# Patient Record
Sex: MALE | Race: WHITE | NOT HISPANIC OR LATINO | URBAN - METROPOLITAN AREA
[De-identification: names, ages, dates, MRNs, and addresses within clinical notes are randomized per-mention and may not be internally consistent; named-entity substitution may affect disease eponyms.]

---

## 2017-06-13 ENCOUNTER — OUTPATIENT (OUTPATIENT)
Dept: OUTPATIENT SERVICES | Facility: HOSPITAL | Age: 57
LOS: 1 days | Discharge: HOME | End: 2017-06-13

## 2017-06-28 DIAGNOSIS — E11.9 TYPE 2 DIABETES MELLITUS WITHOUT COMPLICATIONS: ICD-10-CM

## 2018-01-22 ENCOUNTER — OUTPATIENT (OUTPATIENT)
Dept: OUTPATIENT SERVICES | Facility: HOSPITAL | Age: 58
LOS: 1 days | Discharge: HOME | End: 2018-01-22

## 2018-01-22 DIAGNOSIS — I50.9 HEART FAILURE, UNSPECIFIED: ICD-10-CM

## 2018-01-22 DIAGNOSIS — I25.10 ATHEROSCLEROTIC HEART DISEASE OF NATIVE CORONARY ARTERY WITHOUT ANGINA PECTORIS: ICD-10-CM

## 2018-01-22 DIAGNOSIS — I10 ESSENTIAL (PRIMARY) HYPERTENSION: ICD-10-CM

## 2018-01-22 DIAGNOSIS — E11.9 TYPE 2 DIABETES MELLITUS WITHOUT COMPLICATIONS: ICD-10-CM

## 2019-08-15 ENCOUNTER — EMERGENCY (EMERGENCY)
Facility: HOSPITAL | Age: 59
LOS: 0 days | Discharge: ANOTHER HOSPITAL | End: 2019-08-15
Attending: EMERGENCY MEDICINE | Admitting: EMERGENCY MEDICINE
Payer: MEDICARE

## 2019-08-15 VITALS
SYSTOLIC BLOOD PRESSURE: 98 MMHG | RESPIRATION RATE: 18 BRPM | HEIGHT: 70 IN | DIASTOLIC BLOOD PRESSURE: 58 MMHG | TEMPERATURE: 96 F | HEART RATE: 95 BPM | OXYGEN SATURATION: 99 % | WEIGHT: 149.91 LBS

## 2019-08-15 VITALS
DIASTOLIC BLOOD PRESSURE: 62 MMHG | RESPIRATION RATE: 18 BRPM | SYSTOLIC BLOOD PRESSURE: 105 MMHG | HEART RATE: 102 BPM | OXYGEN SATURATION: 99 %

## 2019-08-15 DIAGNOSIS — R00.0 TACHYCARDIA, UNSPECIFIED: ICD-10-CM

## 2019-08-15 DIAGNOSIS — R10.9 UNSPECIFIED ABDOMINAL PAIN: ICD-10-CM

## 2019-08-15 DIAGNOSIS — K56.609 UNSPECIFIED INTESTINAL OBSTRUCTION, UNSPECIFIED AS TO PARTIAL VERSUS COMPLETE OBSTRUCTION: ICD-10-CM

## 2019-08-15 LAB
ALBUMIN SERPL ELPH-MCNC: 2.5 G/DL — LOW (ref 3.5–5.2)
ALP SERPL-CCNC: 93 U/L — SIGNIFICANT CHANGE UP (ref 30–115)
ALT FLD-CCNC: 9 U/L — SIGNIFICANT CHANGE UP (ref 0–41)
ANION GAP SERPL CALC-SCNC: 15 MMOL/L — HIGH (ref 7–14)
ANION GAP SERPL CALC-SCNC: 17 MMOL/L — HIGH (ref 7–14)
APPEARANCE UR: CLEAR — SIGNIFICANT CHANGE UP
APTT BLD: 34.6 SEC — SIGNIFICANT CHANGE UP (ref 27–39.2)
AST SERPL-CCNC: 36 U/L — SIGNIFICANT CHANGE UP (ref 0–41)
BASE EXCESS BLDV CALC-SCNC: -4.8 MMOL/L — LOW (ref -2–2)
BILIRUB SERPL-MCNC: 0.3 MG/DL — SIGNIFICANT CHANGE UP (ref 0.2–1.2)
BILIRUB UR-MCNC: NEGATIVE — SIGNIFICANT CHANGE UP
BUN SERPL-MCNC: 18 MG/DL — SIGNIFICANT CHANGE UP (ref 10–20)
BUN SERPL-MCNC: 18 MG/DL — SIGNIFICANT CHANGE UP (ref 10–20)
CA-I SERPL-SCNC: 1.15 MMOL/L — SIGNIFICANT CHANGE UP (ref 1.12–1.3)
CALCIUM SERPL-MCNC: 7.7 MG/DL — LOW (ref 8.5–10.1)
CALCIUM SERPL-MCNC: 8.2 MG/DL — LOW (ref 8.5–10.1)
CHLORIDE SERPL-SCNC: 97 MMOL/L — LOW (ref 98–110)
CHLORIDE SERPL-SCNC: 99 MMOL/L — SIGNIFICANT CHANGE UP (ref 98–110)
CO2 SERPL-SCNC: 18 MMOL/L — SIGNIFICANT CHANGE UP (ref 17–32)
CO2 SERPL-SCNC: 19 MMOL/L — SIGNIFICANT CHANGE UP (ref 17–32)
COLOR SPEC: YELLOW — SIGNIFICANT CHANGE UP
CREAT SERPL-MCNC: 1 MG/DL — SIGNIFICANT CHANGE UP (ref 0.7–1.5)
CREAT SERPL-MCNC: 1.1 MG/DL — SIGNIFICANT CHANGE UP (ref 0.7–1.5)
DIFF PNL FLD: SIGNIFICANT CHANGE UP
GAS PNL BLDV: 133 MMOL/L — LOW (ref 136–145)
GAS PNL BLDV: SIGNIFICANT CHANGE UP
GLUCOSE SERPL-MCNC: 259 MG/DL — HIGH (ref 70–99)
GLUCOSE SERPL-MCNC: 274 MG/DL — HIGH (ref 70–99)
GLUCOSE UR QL: ABNORMAL
HCO3 BLDV-SCNC: 18 MMOL/L — LOW (ref 22–29)
HCT VFR BLD CALC: 22 % — LOW (ref 42–52)
HCT VFR BLD CALC: 27.8 % — LOW (ref 42–52)
HCT VFR BLDA CALC: 32.2 % — LOW (ref 34–44)
HGB BLD CALC-MCNC: 10.5 G/DL — LOW (ref 14–18)
HGB BLD-MCNC: 7.1 G/DL — LOW (ref 14–18)
HGB BLD-MCNC: 9 G/DL — LOW (ref 14–18)
INR BLD: 1.22 RATIO — SIGNIFICANT CHANGE UP (ref 0.65–1.3)
KETONES UR-MCNC: ABNORMAL
LACTATE BLDV-MCNC: 1.2 MMOL/L — SIGNIFICANT CHANGE UP (ref 0.5–1.6)
LACTATE SERPL-SCNC: 1.9 MMOL/L — SIGNIFICANT CHANGE UP (ref 0.5–2.2)
LEUKOCYTE ESTERASE UR-ACNC: NEGATIVE — SIGNIFICANT CHANGE UP
MAGNESIUM SERPL-MCNC: 1.8 MG/DL — SIGNIFICANT CHANGE UP (ref 1.8–2.4)
MCHC RBC-ENTMCNC: 26.9 PG — LOW (ref 27–31)
MCHC RBC-ENTMCNC: 27.1 PG — SIGNIFICANT CHANGE UP (ref 27–31)
MCHC RBC-ENTMCNC: 32.3 G/DL — SIGNIFICANT CHANGE UP (ref 32–37)
MCHC RBC-ENTMCNC: 32.4 G/DL — SIGNIFICANT CHANGE UP (ref 32–37)
MCV RBC AUTO: 83.3 FL — SIGNIFICANT CHANGE UP (ref 80–94)
MCV RBC AUTO: 83.7 FL — SIGNIFICANT CHANGE UP (ref 80–94)
NITRITE UR-MCNC: NEGATIVE — SIGNIFICANT CHANGE UP
NRBC # BLD: 0 /100 WBCS — SIGNIFICANT CHANGE UP (ref 0–0)
NRBC # BLD: 0 /100 WBCS — SIGNIFICANT CHANGE UP (ref 0–0)
PCO2 BLDV: 28 MMHG — LOW (ref 41–51)
PH BLDV: 7.43 — SIGNIFICANT CHANGE UP (ref 7.26–7.43)
PH UR: 6 — SIGNIFICANT CHANGE UP (ref 5–8)
PHOSPHATE SERPL-MCNC: 3.1 MG/DL — SIGNIFICANT CHANGE UP (ref 2.1–4.9)
PLATELET # BLD AUTO: 293 K/UL — SIGNIFICANT CHANGE UP (ref 130–400)
PLATELET # BLD AUTO: 382 K/UL — SIGNIFICANT CHANGE UP (ref 130–400)
PO2 BLDV: 38 MMHG — SIGNIFICANT CHANGE UP (ref 20–40)
POTASSIUM BLDV-SCNC: 4.6 MMOL/L — SIGNIFICANT CHANGE UP (ref 3.3–5.6)
POTASSIUM SERPL-MCNC: 4.8 MMOL/L — SIGNIFICANT CHANGE UP (ref 3.5–5)
POTASSIUM SERPL-MCNC: 6.6 MMOL/L — CRITICAL HIGH (ref 3.5–5)
POTASSIUM SERPL-SCNC: 4.8 MMOL/L — SIGNIFICANT CHANGE UP (ref 3.5–5)
POTASSIUM SERPL-SCNC: 6.6 MMOL/L — CRITICAL HIGH (ref 3.5–5)
PROT SERPL-MCNC: 6.6 G/DL — SIGNIFICANT CHANGE UP (ref 6–8)
PROT UR-MCNC: ABNORMAL
PROTHROM AB SERPL-ACNC: 14 SEC — HIGH (ref 9.95–12.87)
RBC # BLD: 2.64 M/UL — LOW (ref 4.7–6.1)
RBC # BLD: 3.32 M/UL — LOW (ref 4.7–6.1)
RBC # FLD: 18 % — HIGH (ref 11.5–14.5)
RBC # FLD: 18.6 % — HIGH (ref 11.5–14.5)
SAO2 % BLDV: 71 % — SIGNIFICANT CHANGE UP
SODIUM SERPL-SCNC: 132 MMOL/L — LOW (ref 135–146)
SODIUM SERPL-SCNC: 133 MMOL/L — LOW (ref 135–146)
SP GR SPEC: >1.05 (ref 1.01–1.02)
UROBILINOGEN FLD QL: SIGNIFICANT CHANGE UP
WBC # BLD: 18.6 K/UL — HIGH (ref 4.8–10.8)
WBC # BLD: 20.61 K/UL — HIGH (ref 4.8–10.8)
WBC # FLD AUTO: 18.6 K/UL — HIGH (ref 4.8–10.8)
WBC # FLD AUTO: 20.61 K/UL — HIGH (ref 4.8–10.8)

## 2019-08-15 PROCEDURE — 71045 X-RAY EXAM CHEST 1 VIEW: CPT | Mod: 26,76

## 2019-08-15 PROCEDURE — 93010 ELECTROCARDIOGRAM REPORT: CPT

## 2019-08-15 PROCEDURE — 74177 CT ABD & PELVIS W/CONTRAST: CPT | Mod: 26

## 2019-08-15 PROCEDURE — 36556 INSERT NON-TUNNEL CV CATH: CPT

## 2019-08-15 PROCEDURE — 99232 SBSQ HOSP IP/OBS MODERATE 35: CPT

## 2019-08-15 PROCEDURE — 99291 CRITICAL CARE FIRST HOUR: CPT | Mod: 25

## 2019-08-15 RX ORDER — PIPERACILLIN AND TAZOBACTAM 4; .5 G/20ML; G/20ML
3.38 INJECTION, POWDER, LYOPHILIZED, FOR SOLUTION INTRAVENOUS ONCE
Refills: 0 | Status: DISCONTINUED | OUTPATIENT
Start: 2019-08-15 | End: 2019-08-15

## 2019-08-15 RX ORDER — PIPERACILLIN AND TAZOBACTAM 4; .5 G/20ML; G/20ML
3.38 INJECTION, POWDER, LYOPHILIZED, FOR SOLUTION INTRAVENOUS ONCE
Refills: 0 | Status: COMPLETED | OUTPATIENT
Start: 2019-08-15 | End: 2019-08-15

## 2019-08-15 RX ORDER — IOHEXOL 300 MG/ML
30 INJECTION, SOLUTION INTRAVENOUS ONCE
Refills: 0 | Status: COMPLETED | OUTPATIENT
Start: 2019-08-15 | End: 2019-08-15

## 2019-08-15 RX ORDER — ONDANSETRON 8 MG/1
4 TABLET, FILM COATED ORAL ONCE
Refills: 0 | Status: COMPLETED | OUTPATIENT
Start: 2019-08-15 | End: 2019-08-15

## 2019-08-15 RX ORDER — SODIUM CHLORIDE 9 MG/ML
1000 INJECTION INTRAMUSCULAR; INTRAVENOUS; SUBCUTANEOUS ONCE
Refills: 0 | Status: COMPLETED | OUTPATIENT
Start: 2019-08-15 | End: 2019-08-15

## 2019-08-15 RX ORDER — SODIUM CHLORIDE 9 MG/ML
1000 INJECTION, SOLUTION INTRAVENOUS ONCE
Refills: 0 | Status: COMPLETED | OUTPATIENT
Start: 2019-08-15 | End: 2019-08-15

## 2019-08-15 RX ORDER — MORPHINE SULFATE 50 MG/1
4 CAPSULE, EXTENDED RELEASE ORAL ONCE
Refills: 0 | Status: DISCONTINUED | OUTPATIENT
Start: 2019-08-15 | End: 2019-08-15

## 2019-08-15 RX ADMIN — SODIUM CHLORIDE 1000 MILLILITER(S): 9 INJECTION, SOLUTION INTRAVENOUS at 07:23

## 2019-08-15 RX ADMIN — PIPERACILLIN AND TAZOBACTAM 25 GRAM(S): 4; .5 INJECTION, POWDER, LYOPHILIZED, FOR SOLUTION INTRAVENOUS at 08:03

## 2019-08-15 RX ADMIN — MORPHINE SULFATE 4 MILLIGRAM(S): 50 CAPSULE, EXTENDED RELEASE ORAL at 04:20

## 2019-08-15 RX ADMIN — IOHEXOL 30 MILLILITER(S): 300 INJECTION, SOLUTION INTRAVENOUS at 03:53

## 2019-08-15 RX ADMIN — SODIUM CHLORIDE 1000 MILLILITER(S): 9 INJECTION, SOLUTION INTRAVENOUS at 06:34

## 2019-08-15 RX ADMIN — ONDANSETRON 4 MILLIGRAM(S): 8 TABLET, FILM COATED ORAL at 04:20

## 2019-08-15 RX ADMIN — SODIUM CHLORIDE 1000 MILLILITER(S): 9 INJECTION INTRAMUSCULAR; INTRAVENOUS; SUBCUTANEOUS at 04:20

## 2019-08-15 NOTE — ED PROVIDER NOTE - CLINICAL SUMMARY MEDICAL DECISION MAKING FREE TEXT BOX
Per surgery resident Dr. Wilson- Dr. Lewis from [facility] accepted the patient. Dr. Wilson gave report to ED attedning. Transfer service involved.

## 2019-08-15 NOTE — ED ADULT NURSE NOTE - ED STAT RN HANDOFF DETAILS 2
Patient being picked up for transferred via ambulance by [facility]. All paperwork provided . Patient being transported via stretcher truck1 with cardiac monitor , IV fluids in progress, NGT disconnect. Patient stable in no acute distress safety maintained.
Refused

## 2019-08-15 NOTE — PROGRESS NOTE ADULT - SUBJECTIVE AND OBJECTIVE BOX
GENERAL SURGERY PROGRESS NOTE     PREET LONG  58y  Male  Hospital day :  POD:  Procedure:   OVERNIGHT EVENTS:    T(F): 96.3 (08-15-19 @ 01:47), Max: 96.3 (08-15-19 @ 01:47)  HR: 126 (08-15-19 @ 06:37) (95 - 126)  BP: 110/58 (08-15-19 @ 06:37) (98/58 - 110/58)  ABP: --  ABP(mean): --  RR: 20 (08-15-19 @ 06:37) (18 - 20)  SpO2: 96% (08-15-19 @ 06:37) (96% - 99%)    DIET/FLUIDS:   NG:                                                                              DRAINS:   BM:   EMESIS:   URINE:  Indwelling Urethral Catheter:     Connect To:  Straight Drainage/Gravity    Indication:  Urine Output Monitoring in Critically Ill (08-15-19 @ 05:25)    GI proph:    AC/ proph:   ABx:     PHYSICAL EXAM:  GENERAL: NAD, well-appearing  CHEST/LUNG: Clear to auscultation bilaterally  HEART: Regular rate and rhythm  ABDOMEN: Soft, Nontender, Nondistended;   EXTREMITIES:  No clubbing, cyanosis, or edema      LABS  Labs:  CAPILLARY BLOOD GLUCOSE                              7.1    18.60 )-----------( 293      ( 15 Aug 2019 05:43 )             22.0         08-15    132<L>  |  99  |  18  ----------------------------<  259<H>  4.8   |  18  |  1.0      Calcium, Total Serum: 7.7 mg/dL (08-15-19 @ 05:43)      LFTs:             6.6  | 0.3  | 36       ------------------[93      ( 15 Aug 2019 03:00 )  2.5  | x    | 9           Lipase:x      Amylase:x         Blood Gas Venous - Lactate: 1.2 mmoL/L (08-15-19 @ 04:47)  Lactate, Blood: 1.9 mmol/L (08-15-19 @ 03:00)      Coags:     14.00  ----< 1.22    ( 15 Aug 2019 05:43 )     34.6                        RADIOLOGY & ADDITIONAL TESTS:  < from: CT Abdomen and Pelvis w/ IV Cont (08.15.19 @ 04:50) >    1. Moderate volume free intraperitoneal fluid with diffuse thickening and   enhancement of the peritoneal lining along with air greater than expected   for this post operative date. Findingsare consistent with peritonitis   and raise suspicion for bowel leak.    2. Diffusely dilated small bowel to the level of the small bowel   anastomosis, compatible with small bowel obstruction which is presumably   confounded by probable reactive change to peritonitis.    3. Post low anterior resection with rectal anastomosis and diverting loop   ileostomy.    4. Small bilateral pleural effusions.    < end of copied text > GENERAL SURGERY PROGRESS NOTE     PREET LONG  58y  Male  Hospital day :  POD:  Procedure:   OVERNIGHT EVENTS:    T(F): 96.3 (08-15-19 @ 01:47), Max: 96.3 (08-15-19 @ 01:47)  HR: 126 (08-15-19 @ 06:37) (95 - 126)  BP: 110/58 (08-15-19 @ 06:37) (98/58 - 110/58)  ABP: --  ABP(mean): --  RR: 20 (08-15-19 @ 06:37) (18 - 20)  SpO2: 96% (08-15-19 @ 06:37) (96% - 99%)    DIET/FLUIDS:   NG:                                                                              DRAINS:   BM:   EMESIS:   URINE:  Indwelling Urethral Catheter:     Connect To:  Straight Drainage/Gravity    Indication:  Urine Output Monitoring in Critically Ill (08-15-19 @ 05:25)    GI proph:    AC/ proph:   ABx:     PHYSICAL EXAM:  GENERAL: NAD, well-appearing  CHEST/LUNG: Clear to auscultation bilaterally  HEART: Regular rate and rhythm  ABDOMEN: midline incision, tender in midline ostomy with fecal output on right side   EXTREMITIES:  No clubbing, cyanosis, or edema      LABS  Labs:  CAPILLARY BLOOD GLUCOSE                              7.1    18.60 )-----------( 293      ( 15 Aug 2019 05:43 )             22.0         08-15    132<L>  |  99  |  18  ----------------------------<  259<H>  4.8   |  18  |  1.0      Calcium, Total Serum: 7.7 mg/dL (08-15-19 @ 05:43)      LFTs:             6.6  | 0.3  | 36       ------------------[93      ( 15 Aug 2019 03:00 )  2.5  | x    | 9           Lipase:x      Amylase:x         Blood Gas Venous - Lactate: 1.2 mmoL/L (08-15-19 @ 04:47)  Lactate, Blood: 1.9 mmol/L (08-15-19 @ 03:00)      Coags:     14.00  ----< 1.22    ( 15 Aug 2019 05:43 )     34.6                        RADIOLOGY & ADDITIONAL TESTS:  < from: CT Abdomen and Pelvis w/ IV Cont (08.15.19 @ 04:50) >    1. Moderate volume free intraperitoneal fluid with diffuse thickening and   enhancement of the peritoneal lining along with air greater than expected   for this post operative date. Findingsare consistent with peritonitis   and raise suspicion for bowel leak.    2. Diffusely dilated small bowel to the level of the small bowel   anastomosis, compatible with small bowel obstruction which is presumably   confounded by probable reactive change to peritonitis.    3. Post low anterior resection with rectal anastomosis and diverting loop   ileostomy.    4. Small bilateral pleural effusions.    < end of copied text >

## 2019-08-15 NOTE — ED ADULT NURSE NOTE - NSIMPLEMENTINTERV_GEN_ALL_ED
Implemented All Fall with Harm Risk Interventions:  [place] to call system. Call bell, personal items and telephone within reach. Instruct patient to call for assistance. Room bathroom lighting operational. Non-slip footwear when patient is off stretcher. Physically safe environment: no spills, clutter or unnecessary equipment. Stretcher in lowest position, wheels locked, appropriate side rails in place. Provide visual cue, wrist band, yellow gown, etc. Monitor gait and stability. Monitor for mental status changes and reorient to person, place, and time. Review medications for side effects contributing to fall risk. Reinforce activity limits and safety measures with patient and family. Provide visual clues: red socks.

## 2019-08-15 NOTE — ED ADULT NURSE NOTE - ED STAT RN HANDOFF DETAILS
Received patient from [facility] to be transferred to [facility] as per RN report given patient awaiting transportation. Patient awake alert and responsive to verbal and tactile stimuli but unwilling to answer questions wants to go home. NGT to left nare with blood tinged output 1000ml on night shift. Martin to bedside drainage with sofia urine. Right sided colostomy with fecal output. V/S stable patient in no acute distress NPO status maintained . Patient resting comfortably with IV hydration and antibotic infusing into right triple lumen patent and intact. All labs and urine sent as ordered. Continue to monitor patient.

## 2019-08-15 NOTE — ED PROCEDURE NOTE - CPROC ED INFUS LINE DETAIL1
The catheter was placed using sterile technique./All lumen(s) aspirated and flushed without difficulty./The location was identified, and the area was draped and prepped./The guidewire was recovered.

## 2019-08-15 NOTE — CONSULT NOTE ADULT - SUBJECTIVE AND OBJECTIVE BOX
GENERAL SURGERY CONSULT NOTE    Patient: PREET LONG , 58y ([birth year])Male   MRN: [id number]  Location: [facility] ED  Visit: 08-15-19 Emergency  Date: 08-15-19 @ 03:36    HPI:  58yM BIBA for [facility] rehab for abdominal pain, nausea, vomiting, s/p of low anterior resection 7/28/19 at [facility] by Dr. Cheatham.     PAST MEDICAL & SURGICAL HISTORY:  s/p of low anterior resection 7/28/19    Home Medications:    VITALS:  T(F): 96.3 (08-15-19 @ 01:47), Max: 96.3 (08-15-19 @ 01:47)  HR: 95 (08-15-19 @ 01:47) (95 - 95)  BP: 98/58 (08-15-19 @ 01:47) (98/58 - 98/58)  RR: 18 (08-15-19 @ 01:47) (18 - 18)  SpO2: 99% (08-15-19 @ 01:47) (99% - 99%)    PHYSICAL EXAM:  General: nauseous.  Cardiac: RRR S1, S2, no Murmurs, rubs or gallops  Respiratory: CTAB  Abdomen: Soft, midline laparotomy scar, RLQ ostomy in place, +stool, +gas, mildly tender, no rebound, no guarding. +BS.    MEDICATIONS  (STANDING):  iohexol 300 mG (iodine)/mL Oral Solution 30 milliLiter(s) Oral once  morphine  - Injectable 4 milliGRAM(s) IV Push Once  ondansetron Injectable 4 milliGRAM(s) IV Push Once  sodium chloride 0.9% Bolus 1000 milliLiter(s) IV Bolus once    LAB/STUDIES:             9.0    20.61 )-----------( 382      ( 15 Aug 2019 03:00 )             27.8     ASSESSMENT:  58yM BIBA for [facility] rehab for abdominal pain, nausea, vomiting, s/p of low anterior resection 7/28/19 at [facility] by Dr. Cheatham. Work up in th ED w/ WBC 20,     PLAN:  - Obtain CTAP w/ PO & IV Contrast.      Above plan discussed with  GENERAL SURGERY CONSULT NOTE    Patient: PREET LONG , 58y ([birth year])Male   MRN: [id number]  Location: [facility] ED  Visit: 08-15-19 Emergency  Date: 08-15-19 @ 03:36    HPI:  58M BIBA for [facility] rehab for abdominal pain, nausea, vomiting, s/p exploratory laparotomy small bowel resection for perforated small bowel 8/6/19 at [facility] due to complication s/p Lap low anterior resection w/ loop ileostomy 7/28/19 at [facility] by Dr. Cheatham.     PAST MEDICAL & SURGICAL HISTORY:  s/p Lap low anterior resection w/ loop ileostomy 7/28/19  s/p exploratory laparotomy small bowel resection for perforated small bowel 8/6/1  DM, HTN, CAD, AICD.  Home Medications:    VITALS:  T(F): 96.3 (08-15-19 @ 01:47), Max: 96.3 (08-15-19 @ 01:47)  HR: 95 (08-15-19 @ 01:47) (95 - 95)  BP: 98/58 (08-15-19 @ 01:47) (98/58 - 98/58)  RR: 18 (08-15-19 @ 01:47) (18 - 18)  SpO2: 99% (08-15-19 @ 01:47) (99% - 99%)    PHYSICAL EXAM:  General: nauseous.  Cardiac: RRR S1, S2, no Murmurs, rubs or gallops  Respiratory: CTAB  Abdomen: Soft, midline laparotomy scar, RLQ ostomy in place, +stool, +gas, mildly tender, no rebound, no guarding. +BS.    MEDICATIONS  (STANDING):  iohexol 300 mG (iodine)/mL Oral Solution 30 milliLiter(s) Oral once  morphine  - Injectable 4 milliGRAM(s) IV Push Once  ondansetron Injectable 4 milliGRAM(s) IV Push Once  sodium chloride 0.9% Bolus 1000 milliLiter(s) IV Bolus once    LAB/STUDIES:             9.0    20.61 )-----------( 382      ( 15 Aug 2019 03:00 )             27.8     08-15    133<L>  |  97<L>  |  18  ----------------------------<  274<H>  6.6<HH>   |  19  |  1.1    Ca    8.2<L>      15 Aug 2019 03:00  Mg     1.9     08-15    TPro  6.6  /  Alb  2.5<L>  /  TBili  0.3  /  DBili  x   /  AST  36  /  ALT  9   /  AlkPhos  93  08-15    LIVER FUNCTIONS - ( 15 Aug 2019 03:00 )  Alb: 2.5 g/dL / Pro: 6.6 g/dL / ALK PHOS: 93 U/L / ALT: 9 U/L / AST: 36 U/L / GGT: x           ASSESSMENT:  58M BIBA for [facility] rehab for abdominal pain, nausea, vomiting, s/p exploratory laparotomy small bowel resection for perforated small bowel 8/6/19 at [facility] due to complication s/p Lap low anterior resection w/ loop ileostomy 7/28/19 at [facility] by Dr. Cheatham. Work up in th ED w/ WBC 20.     PLAN:  - Obtain CTAP w/ PO & IV Contrast.    Above plan discussed with Dr. Albright GENERAL SURGERY CONSULT NOTE    Patient: PREET LONG , 58y ([birth year])Male   MRN: [id number]  Location: [facility] ED  Visit: 08-15-19 Emergency  Date: 08-15-19 @ 03:36    HPI:  58M BIBA for [facility] rehab for abdominal pain, nausea, vomiting, s/p exploratory laparotomy small bowel resection for perforated small bowel 8/6/19 at [facility] due to complication s/p Lap low anterior resection w/ loop ileostomy 7/28/19 at [facility] by Dr. Cheatham.     PAST MEDICAL & SURGICAL HISTORY:  s/p Lap low anterior resection w/ loop ileostomy 7/28/19  s/p exploratory laparotomy small bowel resection for perforated small bowel 8/6/1  DM, HTN, CAD, AICD.  Home Medications:    VITALS:  T(F): 96.3 (08-15-19 @ 01:47), Max: 96.3 (08-15-19 @ 01:47)  HR: 95 (08-15-19 @ 01:47) (95 - 95)  BP: 98/58 (08-15-19 @ 01:47) (98/58 - 98/58)  RR: 18 (08-15-19 @ 01:47) (18 - 18)  SpO2: 99% (08-15-19 @ 01:47) (99% - 99%)    PHYSICAL EXAM:  General: nauseous.  Cardiac: RRR S1, S2, no Murmurs, rubs or gallops  Respiratory: CTAB  Abdomen: Soft, midline laparotomy scar, RLQ ostomy in place, +stool, +gas, mildly tender, no rebound, no guarding. +BS.    MEDICATIONS  (STANDING):  iohexol 300 mG (iodine)/mL Oral Solution 30 milliLiter(s) Oral once  morphine  - Injectable 4 milliGRAM(s) IV Push Once  ondansetron Injectable 4 milliGRAM(s) IV Push Once  sodium chloride 0.9% Bolus 1000 milliLiter(s) IV Bolus once    LAB/STUDIES:             9.0    20.61 )-----------( 382      ( 15 Aug 2019 03:00 )             27.8     08-15    133<L>  |  97<L>  |  18  ----------------------------<  274<H>  6.6<HH>   |  19  |  1.1    Ca    8.2<L>      15 Aug 2019 03:00  Mg     1.9     08-15    TPro  6.6  /  Alb  2.5<L>  /  TBili  0.3  /  DBili  x   /  AST  36  /  ALT  9   /  AlkPhos  93  08-15    LIVER FUNCTIONS - ( 15 Aug 2019 03:00 )  Alb: 2.5 g/dL / Pro: 6.6 g/dL / ALK PHOS: 93 U/L / ALT: 9 U/L / AST: 36 U/L / GGT: x           IMAGING:  < from: CT Abdomen and Pelvis w/ IV Cont (08.15.19 @ 04:50) >  IMPRESSION:    1. Moderate volume free intraperitoneal fluid with diffuse thickening and   enhancement of the peritoneal lining along with air greater than expected   for this post operative date. Findingsare consistent with peritonitis   and raise suspicion for bowel leak.    2. Diffusely dilated small bowel to the level of the small bowel   anastomosis, compatible with small bowel obstruction which is presumably   confounded by probable reactive change to peritonitis.    3. Post low anterior resection with rectal anastomosis and diverting loop   ileostomy.    4. Small bilateral pleural effusions.    ASSESSMENT:  58M BIBA for [facility] rehab for abdominal pain, nausea, vomiting, s/p exploratory laparotomy small bowel resection for perforated small bowel 8/6/19 at [facility] due to complication s/p Lap low anterior resection w/ loop ileostomy 7/28/19 at [facility] by Dr. Cheatham. Work up in th ED w/ WBC 20. Diffuse thickening and enhancement of the peritoneal lining along with air greater than expected for this post operative date. Diffusely dilated small bowel to the level of the small bowel anastomosis, compatible with small bowel obstruction which is presumably confounded by probable reactive change to peritonitis.      PLAN:  - Clinically stable  - Patient to be transferred to [facility]. Accepted by Dr. Lewis patients surgeon.     Above plan discussed with Dr. Albright

## 2019-08-15 NOTE — ED ADULT NURSE NOTE - OBJECTIVE STATEMENT
pt BIB EMS from [facility] for vomiting and abdominal pain  pt is s/p colon resection, colostomy present and filling normally  pt a&o, however intermittent episodes of confusion noted  unable to obtain IV access, central line placed by MD   kahn catheter inserted and draining adequately  pt refusing oral contrast, during an episode of confusion pt pulled out NG tube, replaced by MD and connected to regular suction  education provided by RN, all fall precaitons in place. pt placed on cardiac and respiratory monitoring. EKG obtained.

## 2019-08-15 NOTE — PROGRESS NOTE ADULT - ASSESSMENT
58M BIBA for [facility] rehab for abdominal pain, nausea, vomiting, s/p exploratory laparotomy small bowel resection for perforated small bowel 8/6/19 at [facility] due to complication s/p Lap low anterior resection w/ loop ileostomy 7/28/19 at [facility] by Dr. Cheatham. Work up in th ED w/ WBC 20.     Plan:   - transfer to [facility]

## 2019-08-15 NOTE — ED ADULT NURSE REASSESSMENT NOTE - NS ED NURSE REASSESS COMMENT FT1
pt sleeping in bed comfortably, VS WNL. Report given to transferring service at [facility] for pt transfer.  Endorsed to RN.